# Patient Record
Sex: MALE | HISPANIC OR LATINO
[De-identification: names, ages, dates, MRNs, and addresses within clinical notes are randomized per-mention and may not be internally consistent; named-entity substitution may affect disease eponyms.]

---

## 2021-11-11 ENCOUNTER — NON-APPOINTMENT (OUTPATIENT)
Age: 36
End: 2021-11-11

## 2021-11-18 PROBLEM — Z00.00 ENCOUNTER FOR PREVENTIVE HEALTH EXAMINATION: Status: ACTIVE | Noted: 2021-11-18

## 2021-11-19 ENCOUNTER — NON-APPOINTMENT (OUTPATIENT)
Age: 36
End: 2021-11-19

## 2021-11-19 ENCOUNTER — APPOINTMENT (OUTPATIENT)
Dept: ORTHOPEDIC SURGERY | Facility: CLINIC | Age: 36
End: 2021-11-19
Payer: OTHER MISCELLANEOUS

## 2021-11-19 VITALS
WEIGHT: 250 LBS | DIASTOLIC BLOOD PRESSURE: 88 MMHG | BODY MASS INDEX: 37.03 KG/M2 | SYSTOLIC BLOOD PRESSURE: 132 MMHG | HEIGHT: 69 IN | HEART RATE: 116 BPM

## 2021-11-19 DIAGNOSIS — M54.9 DORSALGIA, UNSPECIFIED: ICD-10-CM

## 2021-11-19 PROCEDURE — 99072 ADDL SUPL MATRL&STAF TM PHE: CPT

## 2021-11-19 PROCEDURE — 99214 OFFICE O/P EST MOD 30 MIN: CPT

## 2021-11-19 NOTE — ASSESSMENT
[FreeTextEntry1] : This is a 36-year-old male that is here today for evaluation of his right lower extremity symptoms and back pain.  He is neurologically intact.  At this point I would encourage him to continue to seek nonoperative or conservative care.  I welcomed him to get a second opinion as he might be a surgical candidate but this is a surgery that I would not personally performed.  He understood this.  He will follow-up on an as-needed basis.

## 2021-11-19 NOTE — HISTORY OF PRESENT ILLNESS
[de-identified] : This is a 36-year-old male that is here today for evaluation of his back pain and right-sided radicular symptoms.  He states that he feels like his right leg is weaker.  He has had the symptoms since November 2019.  At that time he had an accident while stretching at his workplace.  Since that time he has been dealing with these residual symptoms.  He has been seeing pain management, having epidural steroid injections and he has also been participating physical therapy.  Unfortunately symptoms have persisted.

## 2021-11-19 NOTE — PHYSICAL EXAM
[de-identified] : Lumbar radiographs\par No instability on flexion-extension radiographs\par \par Lumbar MRI\par Far lateral disc noted on the right side [de-identified] : Lumbar Physical Exam\par \par Gait -the patient was able to walk without a cane.\par \par Station - Normal\par \par Sagittal balance - Normal\par \par Compensatory mechanism? - None\par \par Heel walk - Normal\par \par Toe walk - Normal\par \par Reflexes\par Patellar - normal\par Gastroc - normal\par Clonus - No\par \par Hip Exam - Normal\par \par Straight leg raise - none\par \par Pulses - 2+ dp/pt\par \par Range of motion - normal\par \par Sensation \par Sensation intact to light touch in L1, L2, L3, L4, L5 and S1 dermatomes bilaterally\par \par Motor\par 	IP	Quad	HS	TA	Gastroc	EHL\par Right	5/5	5/5	5/5	5/5	5/5	5/5\par Left	5/5	5/5	5/5	5/5	5/5	5/5